# Patient Record
Sex: MALE | Race: WHITE | NOT HISPANIC OR LATINO | URBAN - METROPOLITAN AREA
[De-identification: names, ages, dates, MRNs, and addresses within clinical notes are randomized per-mention and may not be internally consistent; named-entity substitution may affect disease eponyms.]

---

## 2020-09-04 ENCOUNTER — EMERGENCY (EMERGENCY)
Facility: HOSPITAL | Age: 40
LOS: 1 days | Discharge: HOME | End: 2020-09-04
Attending: EMERGENCY MEDICINE
Payer: SELF-PAY

## 2020-09-04 VITALS
OXYGEN SATURATION: 97 % | HEART RATE: 74 BPM | TEMPERATURE: 98 F | RESPIRATION RATE: 16 BRPM | DIASTOLIC BLOOD PRESSURE: 91 MMHG | SYSTOLIC BLOOD PRESSURE: 142 MMHG | HEIGHT: 68.9 IN | WEIGHT: 187.39 LBS

## 2020-09-04 PROCEDURE — 72125 CT NECK SPINE W/O DYE: CPT | Mod: 26

## 2020-09-04 PROCEDURE — 72170 X-RAY EXAM OF PELVIS: CPT | Mod: 26

## 2020-09-04 PROCEDURE — 70450 CT HEAD/BRAIN W/O DYE: CPT | Mod: 26

## 2020-09-04 PROCEDURE — 99284 EMERGENCY DEPT VISIT MOD MDM: CPT

## 2020-09-04 PROCEDURE — 71045 X-RAY EXAM CHEST 1 VIEW: CPT

## 2020-09-04 PROCEDURE — 71045 X-RAY EXAM CHEST 1 VIEW: CPT | Mod: 26

## 2020-09-04 PROCEDURE — 72170 X-RAY EXAM OF PELVIS: CPT

## 2020-09-04 PROCEDURE — 99284 EMERGENCY DEPT VISIT MOD MDM: CPT | Mod: 25

## 2020-09-04 PROCEDURE — 72125 CT NECK SPINE W/O DYE: CPT

## 2020-09-04 PROCEDURE — 70450 CT HEAD/BRAIN W/O DYE: CPT

## 2020-09-04 RX ORDER — DIAZEPAM 5 MG
5 TABLET ORAL ONCE
Refills: 0 | Status: DISCONTINUED | OUTPATIENT
Start: 2020-09-04 | End: 2020-09-04

## 2020-09-04 RX ORDER — DIAZEPAM 5 MG
2 TABLET ORAL
Qty: 15 | Refills: 0
Start: 2020-09-04

## 2020-09-04 RX ORDER — IBUPROFEN 200 MG
600 TABLET ORAL ONCE
Refills: 0 | Status: COMPLETED | OUTPATIENT
Start: 2020-09-04 | End: 2020-09-04

## 2020-09-04 RX ADMIN — Medication 600 MILLIGRAM(S): at 18:58

## 2020-09-04 RX ADMIN — Medication 600 MILLIGRAM(S): at 20:42

## 2020-09-04 RX ADMIN — Medication 5 MILLIGRAM(S): at 18:59

## 2020-09-04 NOTE — ED PROVIDER NOTE - MUSCULOSKELETAL, MLM
Mid cervical spine tenderness to palpation. Normal leg touch to left upper extremity. 5/5 strength in all myotomes in the left upper extremity. Tenderness to the left buttock by the sciatic nerve area.

## 2020-09-04 NOTE — ED PROVIDER NOTE - NSFOLLOWUPINSTRUCTIONS_ED_ALL_ED_FT
IMPORTANT INSTRUCTIONS FROM Dr. ZENDEJAS:    Please follow up with your personal medical doctor in 24-48 hours.   Bring results from today to your visit.    If you were advised to take any medications - be sure to review the package insert.    If your symptoms change, get worse or if you have any new symptoms, come to the ER right away.  If you have any questions, call the ER at the phone number on this page. IMPORTANT INSTRUCTIONS FROM Dr. ZENDEJAS:    Please follow up with your personal medical doctor in 24-48 hours.   Bring results from today to your visit.    Take 400mg of motrin or advil or ibuprofen (usually 2 tablets) every 4 hours for pain.  You can also take valium for muscle relaxation additionally if this is not enough.     If you were advised to take any medications - be sure to review the package insert.    Make an appt with the spine center within a few days. Call [phone] for an appt.     If your symptoms change, get worse or if you have any new symptoms, come to the ER right away.  If you have any questions, call the ER at the phone number on this page.
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
## 2020-09-04 NOTE — ED PROVIDER NOTE - PATIENT PORTAL LINK FT
You can access the FollowMyHealth Patient Portal offered by [facility] by registering at the following website: [email or web address]. By joining [employer]’s FollowMyHealth portal, you will also be able to view your health information using other applications (apps) compatible with our system.
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
## 2020-09-04 NOTE — ED PROVIDER NOTE - CARE PLAN
Principal Discharge DX:	Cervical sprain, initial encounter  Assessment and plan of treatment:	L pinky Paresthesia  Secondary Diagnosis:	Paresthesia  Secondary Diagnosis:	Chest pain in adult

## 2020-09-04 NOTE — ED PROVIDER NOTE - PRIOR HOSPITAL/ED VISITS SUMMARY FREE TEXT FOR MDM OBTAINED AND REVIEWED OLD RECORDS QUESTION
EKG from [facility], which I have reviewed, show normal sinus rhythm at 60 beats per minute and no ST changes that are concerning.

## 2020-09-04 NOTE — ED PROVIDER NOTE - CLINICAL SUMMARY MEDICAL DECISION MAKING FREE TEXT BOX
No red flags for back pain. Will obtain x-ray to evaluate for pubic ramus fracture and chest x-ray to evaluate for PTX. Will obtain CAT scan of the head and C-spine. I think that the neurological findings which patient have are not concerning for spinal cord injury without radiological abnormality. For this reason, if imagining is negative, patient will be discharged with a cervical collar if no fracture is found. Will refer to spine center for further evaluation. Patient advised to return to the ED if he notices any change in his symptoms. No red flags for back pain. Will obtain x-ray to evaluate for pubic ramus fracture and chest x-ray to evaluate for PTX. Will obtain CAT scan of the head and C-spine. I think that the neurological findings which patient have are not concerning for spinal cord injury without radiological abnormality in particular bc on exam they were substantially increased/reproduced w palpation to the lateral fossa in elbow. For this reason, if imagining is negative, patient will not be discharged with a cervical collar if no fracture is found. Will refer to spine center for further evaluation. Patient advised to return to the ED if he notices any change in his symptoms.

## 2021-12-21 ENCOUNTER — TRANSCRIPTION ENCOUNTER (OUTPATIENT)
Age: 41
End: 2021-12-21

## 2022-09-28 ENCOUNTER — NON-APPOINTMENT (OUTPATIENT)
Age: 42
End: 2022-09-28

## 2022-10-07 ENCOUNTER — NON-APPOINTMENT (OUTPATIENT)
Age: 42
End: 2022-10-07

## 2022-12-20 ENCOUNTER — NON-APPOINTMENT (OUTPATIENT)
Age: 42
End: 2022-12-20

## 2023-06-11 ENCOUNTER — NON-APPOINTMENT (OUTPATIENT)
Age: 43
End: 2023-06-11

## 2024-06-13 PROBLEM — Z78.9 OTHER SPECIFIED HEALTH STATUS: Chronic | Status: ACTIVE | Noted: 2020-09-04

## 2024-06-14 ENCOUNTER — APPOINTMENT (OUTPATIENT)
Dept: ORTHOPEDIC SURGERY | Facility: CLINIC | Age: 44
End: 2024-06-14
Payer: COMMERCIAL

## 2024-06-14 VITALS
WEIGHT: 205 LBS | DIASTOLIC BLOOD PRESSURE: 80 MMHG | SYSTOLIC BLOOD PRESSURE: 127 MMHG | HEART RATE: 82 BPM | HEIGHT: 69 IN | BODY MASS INDEX: 30.36 KG/M2

## 2024-06-14 DIAGNOSIS — M79.672 PAIN IN LEFT FOOT: ICD-10-CM

## 2024-06-14 PROBLEM — Z00.00 ENCOUNTER FOR PREVENTIVE HEALTH EXAMINATION: Status: ACTIVE | Noted: 2024-06-14

## 2024-06-14 PROCEDURE — 99203 OFFICE O/P NEW LOW 30 MIN: CPT

## 2024-06-14 PROCEDURE — 73630 X-RAY EXAM OF FOOT: CPT | Mod: LT
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
## 2024-06-14 NOTE — PHYSICAL EXAM
[de-identified] : General: No apparent distress Cardiovascular: extremities warm and well-perfused, no cyanosis Pulmonary: non labored respirations  Left foot: Tender to palpation of plantar aspect of first MTP joint Pain with passive dorsiflexion of MTP joint Able to flex and extend big toe without restriction [de-identified] : 3 views of the left foot obtained today and interpreted by me including AP, oblique, lateral views demonstrate lateral sesamoid fracture  Images reviewed in detail with the patient

## 2024-06-14 NOTE — HISTORY OF PRESENT ILLNESS
[de-identified] : SOURAV RAPHAEL  is a 44 year old M who presents with left foot pain.  He says that in early April he was running with a new pair Shoes when afterward he felt pain on the plantar aspect of his great toe.  Since then he has had pain in that area with movement of the toe.  Describes it as a stinging and throbbing sensation in the foot.  He has been running and says it does not hurt very much when he runs but he has stopped wearing those shoes as he did think that it was causing more pain.

## 2024-08-31 ENCOUNTER — NON-APPOINTMENT (OUTPATIENT)
Age: 44
End: 2024-08-31

## 2024-12-28 ENCOUNTER — NON-APPOINTMENT (OUTPATIENT)
Age: 44
End: 2024-12-28

## 2025-01-28 ENCOUNTER — NON-APPOINTMENT (OUTPATIENT)
Age: 45
End: 2025-01-28

## 2025-01-29 ENCOUNTER — NON-APPOINTMENT (OUTPATIENT)
Age: 45
End: 2025-01-29

## 2025-01-31 ENCOUNTER — NON-APPOINTMENT (OUTPATIENT)
Age: 45
End: 2025-01-31